# Patient Record
Sex: FEMALE | Race: WHITE
[De-identification: names, ages, dates, MRNs, and addresses within clinical notes are randomized per-mention and may not be internally consistent; named-entity substitution may affect disease eponyms.]

---

## 2019-04-11 ENCOUNTER — HOSPITAL ENCOUNTER (EMERGENCY)
Dept: HOSPITAL 10 - E/R | Age: 49
Discharge: ANOTHER HOSPITAL | End: 2019-04-11
Attending: INTERNAL MEDICINE
Payer: COMMERCIAL

## 2019-04-11 ENCOUNTER — HOSPITAL ENCOUNTER (EMERGENCY)
Dept: HOSPITAL 91 - E/R | Age: 49
Discharge: ANOTHER HOSPITAL | End: 2019-04-11
Payer: COMMERCIAL

## 2019-04-11 VITALS — WEIGHT: 132.28 LBS | HEIGHT: 55 IN | BODY MASS INDEX: 30.61 KG/M2

## 2019-04-11 VITALS — SYSTOLIC BLOOD PRESSURE: 167 MMHG | DIASTOLIC BLOOD PRESSURE: 79 MMHG | HEART RATE: 81 BPM | RESPIRATION RATE: 15 BRPM

## 2019-04-11 DIAGNOSIS — Q28.2: ICD-10-CM

## 2019-04-11 DIAGNOSIS — R79.1: ICD-10-CM

## 2019-04-11 DIAGNOSIS — R40.2132: ICD-10-CM

## 2019-04-11 DIAGNOSIS — D69.6: ICD-10-CM

## 2019-04-11 DIAGNOSIS — K70.30: ICD-10-CM

## 2019-04-11 DIAGNOSIS — I61.8: Primary | ICD-10-CM

## 2019-04-11 DIAGNOSIS — R74.0: ICD-10-CM

## 2019-04-11 DIAGNOSIS — D72.819: ICD-10-CM

## 2019-04-11 DIAGNOSIS — R40.2352: ICD-10-CM

## 2019-04-11 DIAGNOSIS — R56.9: ICD-10-CM

## 2019-04-11 DIAGNOSIS — E80.6: ICD-10-CM

## 2019-04-11 DIAGNOSIS — R40.2242: ICD-10-CM

## 2019-04-11 DIAGNOSIS — R41.82: ICD-10-CM

## 2019-04-11 DIAGNOSIS — J45.909: ICD-10-CM

## 2019-04-11 LAB
ABNORMAL IP MESSAGE: 1
ADD MAN DIFF?: NO
ADD UMIC: YES
ALANINE AMINOTRANSFERASE: 55 IU/L (ref 13–69)
ALBUMIN: 4 G/DL (ref 3.3–4.9)
ALKALINE PHOSPHATASE: 72 IU/L (ref 42–121)
AMMONIA: 20 UMOL/L (ref 9–30)
ANION GAP: 12 (ref 5–13)
ASPARTATE AMINO TRANSFERASE: 114 IU/L (ref 15–46)
BASOPHIL #: 0 10^3/UL (ref 0–0.1)
BASOPHILS %: 0.5 % (ref 0–2)
BILIRUBIN,DIRECT: 0 MG/DL (ref 0–0.2)
BILIRUBIN,TOTAL: 4.9 MG/DL (ref 0.2–1.3)
BLOOD UREA NITROGEN: 7 MG/DL (ref 7–20)
CALCIUM: 9.9 MG/DL (ref 8.4–10.2)
CARBON DIOXIDE: 26 MMOL/L (ref 21–31)
CHLORIDE: 101 MMOL/L (ref 97–110)
CREATININE: 0.49 MG/DL (ref 0.44–1)
EOSINOPHILS #: 0 10^3/UL (ref 0–0.5)
EOSINOPHILS %: 0.7 % (ref 0–7)
ETHANOL: < 10 MG/DL (ref 0–0)
GLUCOSE: 132 MG/DL (ref 70–220)
HEMATOCRIT: 37.4 % (ref 37–47)
HEMOGLOBIN: 12.8 G/DL (ref 12–16)
IMMATURE GRANS #M: 0.02 10^3/UL (ref 0–0.03)
IMMATURE GRANS % (M): 0.5 % (ref 0–0.43)
INR: 1.44
LYMPHOCYTES #: 0.7 10^3/UL (ref 0.8–2.9)
LYMPHOCYTES %: 16.8 % (ref 15–51)
MEAN CORPUSCULAR HEMOGLOBIN: 30.5 PG (ref 29–33)
MEAN CORPUSCULAR HGB CONC: 34.2 G/DL (ref 32–37)
MEAN CORPUSCULAR VOLUME: 89 FL (ref 82–101)
MEAN PLATELET VOLUME: 10.5 FL (ref 7.4–10.4)
MONOCYTE #: 0.4 10^3/UL (ref 0.3–0.9)
MONOCYTES %: 8.9 % (ref 0–11)
NEUTROPHIL #: 2.9 10^3/UL (ref 1.6–7.5)
NEUTROPHILS %: 72.6 % (ref 39–77)
NUCLEATED RED BLOOD CELLS #: 0 10^3/UL (ref 0–0)
NUCLEATED RED BLOOD CELLS%: 0 /100WBC (ref 0–0)
PLATELET COUNT: 62 10^3/UL (ref 140–415)
POSITIVE DIFF: (no result)
POTASSIUM: 3.7 MMOL/L (ref 3.5–5.1)
PROTIME: 17.6 SEC (ref 11.9–14.9)
PT RATIO: 1.4
RED BLOOD COUNT: 4.2 10^6/UL (ref 4.2–5.4)
RED CELL DISTRIBUTION WIDTH: 15 % (ref 11.5–14.5)
SODIUM: 139 MMOL/L (ref 135–144)
TOTAL PROTEIN: 7.9 G/DL (ref 6.1–8.1)
TYPE AND SCREEN: 1 1
UR ASCORBIC ACID: 40 MG/DL
UR BACTERIA: (no result) /HPF
UR BILIRUBIN (DIP): NEGATIVE MG/DL
UR BLOOD (DIP): (no result) MG/DL
UR CLARITY: CLEAR
UR COLOR: YELLOW
UR GLUCOSE (DIP): NEGATIVE MG/DL
UR KETONES (DIP): (no result) MG/DL
UR LEUKOCYTE ESTERASE (DIP): NEGATIVE LEU/UL
UR MUCUS: (no result) /HPF
UR NITRITE (DIP): NEGATIVE MG/DL
UR PH (DIP): 6 (ref 5–9)
UR RBC: 4 /HPF (ref 0–5)
UR SPECIFIC GRAVITY (DIP): 1.01 (ref 1–1.03)
UR TOTAL PROTEIN (DIP): NEGATIVE MG/DL
UR UROBILINOGEN (DIP): (no result) MG/DL
UR WBC: 4 /HPF (ref 0–5)
WHITE BLOOD COUNT: 4 10^3/UL (ref 4.8–10.8)

## 2019-04-11 PROCEDURE — P9059 PLASMA, FRZ BETWEEN 8-24HOUR: HCPCS

## 2019-04-11 PROCEDURE — 85610 PROTHROMBIN TIME: CPT

## 2019-04-11 PROCEDURE — 81025 URINE PREGNANCY TEST: CPT

## 2019-04-11 PROCEDURE — 81001 URINALYSIS AUTO W/SCOPE: CPT

## 2019-04-11 PROCEDURE — 36415 COLL VENOUS BLD VENIPUNCTURE: CPT

## 2019-04-11 PROCEDURE — 80048 BASIC METABOLIC PNL TOTAL CA: CPT

## 2019-04-11 PROCEDURE — 36430 TRANSFUSION BLD/BLD COMPNT: CPT

## 2019-04-11 PROCEDURE — 86901 BLOOD TYPING SEROLOGIC RH(D): CPT

## 2019-04-11 PROCEDURE — 99285 EMERGENCY DEPT VISIT HI MDM: CPT

## 2019-04-11 PROCEDURE — 71045 X-RAY EXAM CHEST 1 VIEW: CPT

## 2019-04-11 PROCEDURE — 82140 ASSAY OF AMMONIA: CPT

## 2019-04-11 PROCEDURE — 96374 THER/PROPH/DIAG INJ IV PUSH: CPT

## 2019-04-11 PROCEDURE — 93005 ELECTROCARDIOGRAM TRACING: CPT

## 2019-04-11 PROCEDURE — 80076 HEPATIC FUNCTION PANEL: CPT

## 2019-04-11 PROCEDURE — 70496 CT ANGIOGRAPHY HEAD: CPT

## 2019-04-11 PROCEDURE — 96375 TX/PRO/DX INJ NEW DRUG ADDON: CPT

## 2019-04-11 PROCEDURE — 70498 CT ANGIOGRAPHY NECK: CPT

## 2019-04-11 PROCEDURE — 86850 RBC ANTIBODY SCREEN: CPT

## 2019-04-11 PROCEDURE — 70450 CT HEAD/BRAIN W/O DYE: CPT

## 2019-04-11 PROCEDURE — 85025 COMPLETE CBC W/AUTO DIFF WBC: CPT

## 2019-04-11 PROCEDURE — 86900 BLOOD TYPING SEROLOGIC ABO: CPT

## 2019-04-11 PROCEDURE — 80307 DRUG TEST PRSMV CHEM ANLYZR: CPT

## 2019-04-11 RX ADMIN — IOHEXOL 1 ML/S: 350 INJECTION, SOLUTION INTRAVENOUS at 15:27

## 2019-04-11 RX ADMIN — THIAMINE HYDROCHLORIDE 1 MLS/HR: 100 INJECTION, SOLUTION INTRAMUSCULAR; INTRAVENOUS at 14:26

## 2019-04-11 RX ADMIN — SODIUM CHLORIDE 1 MLS/HR: 9 INJECTION, SOLUTION INTRAVENOUS at 16:55

## 2019-04-11 RX ADMIN — LEVETIRACETAM 1 MLS/HR: 10 INJECTION INTRAVENOUS at 15:02

## 2019-04-11 RX ADMIN — VASOPRESSIN 1 ML/S: 20 INJECTION, SOLUTION INTRAMUSCULAR; SUBCUTANEOUS at 15:27

## 2019-04-11 RX ADMIN — LIDOCAINE HYDROCHLORIDE 1 MLS/HR: 10 INJECTION, SOLUTION EPIDURAL; INFILTRATION; INTRACAUDAL; PERINEURAL at 16:54

## 2019-04-11 RX ADMIN — POTASSIUM ACETATE 1 MLS/HR: 3.93 INJECTION, SOLUTION, CONCENTRATE INTRAVENOUS at 16:58

## 2019-04-11 RX ADMIN — LIDOCAINE HYDROCHLORIDE 1 MLS/HR: 10 INJECTION, SOLUTION EPIDURAL; INFILTRATION; INTRACAUDAL; PERINEURAL at 14:44

## 2019-04-11 RX ADMIN — SODIUM CHLORIDE 1 ML: 9 INJECTION, SOLUTION INTRAMUSCULAR; INTRAVENOUS; SUBCUTANEOUS at 15:27

## 2019-04-11 NOTE — ERD
ER Documentation


Chief Complaint


Chief Complaint


Shaking episode, altered mental status





HPI


This is a 48-year-old female with a past medical history of alcoholic cirrhosis 


requiring paracenteses in the past, asthma, gastritis/GERD, previous episodes of


GI bleeding who is presenting today for altered mentation.  The patient 


reportedly went to work and seemed fine this morning.  She went out to her car 


to go get something but did not come back and side.  The coworkers were worried 


about her, so they found her altered in her van.  An ambulance was called.  In 


route to the hospital, the paramedics witnessed several generalized tonic-clonic


shaking episodes.  The patient was given a total of 10 mg of Versed IV prior to 


arrival.  The patient is moving all extremities without difficulty, but she is 


confused and currently unable to answer questions.  According to the patient's 


family, the patient has had a headache for several days.





History and physical is limited secondary to altered mentation.





ROS


Limited secondary to altered mentation.





Medications


Home Meds


Reported Medications


Albuterol Sulfate* (Ventolin HFA*) 18 Gm Hfa.aer.ad, 2 PUFF INHALATION Q4H, #1 


INHALER


   19


Discontinued Reported Medications


Omeprazole* (Omeprazole*) 20 Mg Capsule.dr, 20 MG PO DAILY


   13


Spironolactone* (Aldactone*) 50 Mg Tablet, 50 MG PO DAILY


   13


Furosemide (Lasix) 40 Mg Tab, 40 MG PO DAILY


   13





Allergies


Allergies:  


Coded Allergies:  


     No Known Allergies (Verified  Allergy, Unknown, 19)





PMhx/Soc


History of Surgery:  Yes ()


Anesthesia Reaction:  No


Hx Neurological Disorder:  No


Hx Respiratory Disorders:  No


Hx Cardiac Disorders:  No


Hx Psychiatric Problems:  No


Hx Miscellaneous Medical Probl:  Yes (EDEMA IN ABDOMEN AND FEET)


Hx Alcohol Use:  Yes (OCCASIONAL)


Hx Substance Use:  No


Hx Tobacco Use:  No





FmHx


Unable to obtain secondary to altered mentation.





Physical Exam


Vitals





Vital Signs


  Date      Temp  Pulse  Resp  B/P (MAP)   Pulse Ox  O2          O2 Flow    FiO2


Time                                                 Delivery    Rate


   19           85    18      154/83       100  Room Air


     15:00                          (106)


   19           78    18      136/77       100  Room Air


     14:30                           (96)


   19           78    14      139/57        98  Room Air


     14:00                           (84)


   19  98.6    105    20      143/82        98


     13:34                          (102)





Physical Exam


Const:   No apparent distress, well-developed, well-nourished


Head:   Normocephalic, Atraumatic 


Eyes:   Normal Conjunctiva.  No scleral icterus.  Pupils equal, round and 


reactive to light


ENT:   Normal External Ears, Nose and Mouth.  Abrasions to her tongue.


Neck:   Trachea midline.  No palpable masses.  No step-offs or deformities to 


the cervical spine.


Resp:   Clear to auscultation bilaterally, No wheezes, rales or rhonchi


Cardio:   Regular rate and rhythm. No murmurs, rubs or gallops


Abd:   Soft, non tender, non distended. Normal bowel sounds


Skin:   No petechiae or rashes


Back:   No step-offs or deformities.


Ext:   No cyanosis, or edema


Neur:   Eyes are closed.  Patient makes incomprehensible sounds.  Patient 


localizes pain.  GCS 8.


Result Diagram:  


19 1300                                                                    


           19 1259





Results 24 hrs





Laboratory Tests


Test
              19
12:59   19
13:00    19
13:20  19
13:32


Sodium Level         139 mmol/L


Potassium Level      3.7 mmol/L


Chloride Level       101 mmol/L


Carbon Dioxide        26 mmol/L


Level


Anion Gap                    12


Blood Urea              7 mg/dl


Nitrogen


Creatinine           0.49 mg/dl


Est Glomerular     > 60 mL/min 
  
               
                



Filtrat


Rate
mL/min


Glucose Level         132 mg/dl


Calcium Level         9.9 mg/dl


Ethyl Alcohol      < 10.0 mg/dl


Level


White Blood Count                   4.0 10^3/ul


Red Blood Count                    4.20 10^6/ul


Hemoglobin                            12.8 g/dl


Hematocrit                               37.4 %


Mean Corpuscular                        89.0 fl


Volume


Mean Corpuscular                        30.5 pg


Hemoglobin


Mean Corpuscular   
                 34.2 g/dl 
  
                



Hemoglobin
Concen


t


Red Cell                                 15.0 %


Distribution


Width


Platelet Count                       62 10^3/UL


Mean Platelet                           10.5 fl


Volume


Immature                                0.500 %


Granulocytes %


Neutrophils %                            72.6 %


Lymphocytes %                            16.8 %


Monocytes %                               8.9 %


Eosinophils %                             0.7 %


Basophils %                               0.5 %


Nucleated Red                       0.0 /100WBC


Blood Cells %


Immature                          0.020 10^3/ul


Granulocytes #


Neutrophils #                       2.9 10^3/ul


Lymphocytes #                       0.7 10^3/ul


Monocytes #                         0.4 10^3/ul


Eosinophils #                       0.0 10^3/ul


Basophils #                         0.0 10^3/ul


Nucleated Red                       0.0 10^3/ul


Blood Cells #


Urine Color                                       YELLOW


Urine Clarity                                     CLEAR


Urine pH                                                     6.0


Urine Specific                                             1.012


Gravity


Urine Ketones                                     TRACE mg/dL


Urine Nitrite                                     NEGATIVE mg/dL


Urine Bilirubin                                   NEGATIVE mg/dL


Urine                                                   2+ mg/dL


Urobilinogen


Urine Leukocyte    
              
               NEGATIVE
Jerel/ul  



Esterase



Urine Microscopic                                         4 /HPF


RBC


Urine Microscopic                                         4 /HPF


WBC


Urine Bacteria                                    FEW /HPF


Urine Mucus                                       MANY /HPF


Urine Hemoglobin                                        1+ mg/dL


Urine Glucose                                     NEGATIVE mg/dL


Urine Total                                       NEGATIVE mg/dl


Protein


Urine Opiates                                     Negative


Screen


Urine                                             Negative


Barbiturates


Urine                                             Negative


Amphetamines


Screen


Urine                                             Positive


Benzodiazepines


Screen


Urine Cocaine                                     Negative


Screen


Urine                                             Negative


Cannabinoids


POC Beta HCG,                                                      NEGATIVE


Qualitative


Test
              19
13:36  
               
                



Prothrombin Time       17.6 Sec


Prothrombin Time            1.4


Ratio


INR International         1.44 
  
               
                



Normalized
Ratio


Total Bilirubin       4.9 mg/dl


Direct Bilirubin     0.00 mg/dl


Indirect              4.9 mg/dl


Bilirubin


Aspartate Amino       114 IU/L 
  
               
                



Transf
(AST/SGOT)


Alanine                55 IU/L 
  
               
                



Aminotransferase



(ALT/SGPT)


Alkaline                72 IU/L


Phosphatase


Ammonia               20 umol/l


Total Protein          7.9 g/dl


Albumin                4.0 g/dl





Current Medications


 Medications
   Dose
          Sig/Patrice
       Start Time
   Status  Last


 (Trade)       Ordered        Route
 PRN     Stop Time              Admin
Dose


                              Reason                                Admin


 Sodium         1,000 ml @ 
   Q1H STAT
      19       DC           19


Chloride       1,000 mls/hr   IV
            12:53
                       14:26



                                             19 13:52


                100 ml @ 
     ONCE  ONCE
    19       DC           19


Levetiracetam  400 mls/hr     IVPB
          15:00
                       15:02



                                             19 15:00


 Sodium         250 ml @ 0
    Q0M ONCE
      19       DC       



Chloride       mls/hr         IV*
           14:44



                                             19 14:48


 Ondansetron    4 mg           ER BRIDGE      19                



HCl
  (Zofran                 PRN
 IV
       15:00



Inj)                          NAUSEA/VOMITI  19 14:59


                              NG


                650 mg         ER BRIDGE      19                



Acetaminophen                 PRN
 PO
       15:00




  (Tylenol                   .MILD PAIN     4/12/19 14:59


Tab)                          1-3 OR TEMP


 Sodium         1,000 ml @ 
   Q20H
 IV
      19       DC       



Chloride       50 mls/hr                     14:52



                                             19 15:38


 IV Flush
      3 ml           PER            19                



(NS 3 ml)                     PROTOCOL
 IV
  15:00



 Ondansetron    4 mg           Q6H  PRN
      19                



HCl
  (Zofran                 IV
            15:00



Inj)                          NAUSEA/VOMITI


                              NG


                40 mg          DAILY@06
      19                



Pantoprazole
                 IV
            06:00



 (Protonix


Iv)


                100 ml @ 
     Q12
 IVPB
     19                



Levetiracetam  400 mls/hr                    23:00



 Lorazepam
     2 mg           Q10MIN  PRN
   19                



(Ativan)                      IV
 seizure    15:00



 Labetalol      10 mg          Q4  PRN
 IV
   19                



HCl
                          sbp >160       15:00



(Labetalol)


 Sodium         0 ml @ 0
      Q0M ONCE
      19       DC       



Chloride       mls/hr         IV
            15:21



                                             19 15:22


 IV Flush
      10 ml          STK-MED        19       DC           19


(NS 10 ml)                    ONCE
 .ROUTE
  15:27
                       15:27



                                             19 15:28


 Sodium         100 ml @ ud    STK-MED        19       DC           19


Chloride                      ONCE
 .ROUTE
  15:27
                       15:27



                                             19 15:28


 Iohexol        100 ml @ ud    STK-MED        19       DC           19


                              ONCE
 .ROUTE
  15:27
                       15:27



                                             19 15:28


 Albuterol/
    3 ml           Q2H RESP       19                



Ipratropium
                  THERAPY  PRN
  15:30



(Duoneb)                      HHN



                              shortness of


                              breath


 Sodium         1,000 ml @ 
   Q20H
 IV
      19                



Chloride       50 mls/hr                     16:00



                54.5 ml @ 
    ONCE  ONCE
    19                



Desmopressin
  109 mls/hr     IVPB
          17:30



Acetate 18                                   19 17:59


mcg/
 Sodium


Chloride








Procedures/MDM


MDM





The patient's presentation warrants further investigation. Previous medical re


cords, if available, were reviewed.





LABS





The patient's laboratory testing was obtained and reviewed. No emergent 


treatment was required unless described below.





CBC:   No E/o systemic infection or severe anemia.  Leukopenia and 


thrombocytopenia, consistent with alcoholic liver disease.


Chemistry:   No E/o severe acidosis or alkalosis or renal failure or liver 


disease or diabetic ketoacidosis


INR:   Slightly elevated at 1.44


Ammonia:   Within normal limits


Urine:   No E/o acute infection or hematuria


Tox:   No E/o alcohol abuse. No E/o illicit drug use.


EKG





EKG read by me: 


Rate/Rhythm:    Regular rate and rhythm at a rate of 87 bpm


Intervals:    Normal


Axis:    Normal


Impression:    No evidence of acute ischemia or arrhythmia





IMAGING





Imaging and Radiology interpretation reviewed.





CXR


FINDINGS: The heart and mediastinum are within normal limits.  The lungs are 


clear.  There is no pleural effusion or pneumothorax.   There is a healed left 


mid to distal clavicular fracture.  


IMPRESSION: No acute disease.


Electronically viewed and signed by .Barry Gallardo MD, MD on 2019 


13:18 





CT Head


FINDINGS: There is acute interparenchymal hemorrhage involving the inferior and 


anterior aspect of right lentiform nucleus extending to the posterior aspect of 


frontal lobe with associated moderate peripheral edema. There is approximately 3


mm leftward midline shift. There is no hydrocephalus. 


There is no extra-axial fluid collection. The ventricles and sulci are normal in


size and configuration. The density of the brain is otherwise normal, and the 


gray white matter differentiation appears well-preserved.  The visualized 


paranasal sinuses and osseous structures are grossly unremarkable. 


IMPRESSION: Acute interparenchymal hemorrhage involving the inferior and 


anterior aspect of right lentiform nucleus extending to the posterior frontal 


lobe with associated moderate peripheral edema. Leftward midline shift of 3 mm. 


Findings discussed with Dr. Arcihbald at 02:30 p.m. on 2019.


Electronically viewed and signed by Physician Shira on 2019 


14:49 





TREATMENT/DISPOSITION





The patient presents with altered mentation and concerns of a seizure event.  


The patient CT of the head reveals an acute right intraparenchymal hemorrhage 


with expansion and shift.  The patient was ordered FFP, platelets and DDAVP to 


help with the bleeding.  The patient was also given Keppra in the emergency 


department for seizure prophylaxis.  While in the emergency department, the 


patient's mentation improved.  She was tired but easily arousable.  She was 


verbal and oriented x1.  As the GCS is improving, I do not feel the patient 


requires intubation emergently.





The patient's alcohol level is currently negative. She does have a history of 


alcoholic cirrhosis, but the family reports that she has not had any alcohol in 


several years.  I do not suspect an alcohol withdrawal seizure.  The patient is 


postictal in the emergency department.  I have decreased suspicion for an 


infectious process.  The patient is afebrile.  The patient does have a mild 


leukopenia, but I suspect that this is related to her alcohol abuse over a 


systemic infection.





I had initially intended to admit the patient to the ICU, as the radiologist did


not feel that this was related to cerebral aneurysm or vascular injury. I 


discussed the case with the on-call neurosurgeon, Dr. Sebastian, who recommended 


that we emergently perform a CTA of the head and neck to confirm this.  He read 


the CTA study, which was concerning for an cerebral AV malformation, which is 


likely the source of her bleeding.  He recommended that the patient be 


transferred to [facility] for higher level of care.





TRANSFER





At this time, I feel that the patient requires admission for further evaluation 


and management. Dr. Sebastian also works at [facility] and accepted the patient 


to the neuro ICU at [facility].





Disclaimer: Inadvertent spelling and grammatical errors are likely due to 


EHR/dictation software use and do not reflect on the overall quality of patient 


care. Note that the electronic time recorded on this note does not necessarily 


reflect the actual time of the patient encounter.





CRITICAL CARE NOTE





Time: 40 minutes excluding all billable procedures.





Treatments/Evaluations: The patient was at risk of hemodynamic compromise.  


Timing of critical care involved close serial monitoring, evaluation of the 


patient's medical record including previous records & current laboratory/imaging


studies, potential interventions for prevention of hemodynamic/ cardiopulmonary/


neurologic compromise, maintaining tight fluid balance, and any discussions with


the family and/or consultants regarding the patient's status and prognosis.





Departure


Diagnosis:  


   Primary Impression:  


   Intraparenchymal hemorrhage of brain


   Additional Impressions:  


   Seizure


   Altered mental status


   Altered mental status type:  unspecified  Qualified Codes:  R41.82 - Altered 


   mental status, unspecified


   Alcoholic cirrhosis


   Ascites presence:  without ascites  Qualified Codes:  K70.30 - Alcoholic 


   cirrhosis of liver without ascites


   Leukopenia


   Leukopenia type:  unspecified  Qualified Codes:  D72.819 - Decreased white 


   blood cell count, unspecified


   Thrombocytopenia


   Cerebral AV malformation


   Hyperbilirubinemia


   Transaminitis


   Elevated INR


Condition:  Critical











TERI ARCHIBALD MD              2019 13:29

## 2019-04-11 NOTE — HP
Date/Time of Note


Date/Time of Note


DATE: 4/11/19 


TIME: 15:46





Assessment/Plan


VTE Prophylaxis


Pharmacological prophylaxis:  other





Lines/Catheters


IV Catheter Type (from [facility]):  Saline Lock





Assessment/Plan


Hospital Course


Patient is a  female the past medical history significant for alcoholic


liver cirrhosis, asthma, history of GI bleed, GERD who presents to [facility] for brain bleed.  Patient was fine this morning and went 


to work as usual.  Patient apparently went out to her car from other coworkers 


and did not return.  Patient was subsequently found by passerby passed out in 


her van.  Ambulance was called and on route to the hospital paramedics witnessed


several generalized tonic-clonic seizures.  Patient currently is drowsy however 


is able to answer yes or no questions although slowly.  HPI is difficult to rece


vincent as patient is still mildly altered.  According to patient  at bedside


the patient has not drink alcohol in quite a while.





Objective





Physical exam





General: Patient is laying in bed and answers questions with yes or no very 


slowly


Mentation: Patient is not currently oriented drowsy but arousable,,


Head: Normocephalic atraumatic


Eyes: EOMI, pupils reactive to light


Neck: Supple, nontender, midline


Respiratory: Clear to auscultation bilaterally


Cardiovascular: regular rate, no obvious murmurs


Gastrointestinal: non-tender to palpation, bowel sounds heard.


Neurological: Moves all extremities spontaneously, but left upper extremity and 


left lower extremity is slightly weaker than the right.


Skin: No new skin lesions





Assessment and plan





Acute intraparenchymal hemorrhage


-Inferior and anterior aspect of the right lentiform nucleus extending to the 


posterior aspect of frontal lobe with edema with a 3 mm midline shift 


-neurosurgery, Dr. Sebastian, called, wants CT angio to evaluate for vascular 


issues before admission, if aneurysm is likely, patient will be transferred to 


higher level of care


-The patient was mildly drowsy on evaluation however she did receive Versed on 


entry into the ED so unsure whether or not this is from medication or from the 


brain bleed


-Neurology, Dr. Kirkpatrick, consulted


-Keep blood pressure normotensive


-PPI


-N.p.o.


-Neurosurgery recommendations





Tonic-clonic movement


-Seizure versus other shaking


-As needed Ativan


-Keppra IV


-Neurology consultation pending





Alcoholic liver cirrhosis


-Elevated bilirubin


-Patient on spironolactone and Lasix at home, hold for now


-Chronic issue, monitor closely





Asthma


-DuoNeb as needed





GERD


-PPI





Disposition


-Awaiting results of CT angiogram, possible transfer, otherwise, will go to our 


ICU with neurosurgeon and neurology consultation.


Result Diagram:  


4/11/19 1300                                                                    


           4/11/19 1259





Results 24hrs





Laboratory Tests


Test
                 4/11/19
12:59  4/11/19
13:00  4/11/19
13:20  4/11/19
13:32


Sodium Level                  139


Potassium Level               3.7


Chloride Level                101


Carbon Dioxide Level           26


Anion Gap                      12


Blood Urea Nitrogen             7


Creatinine                   0.49


Est Glomerular        > 60  
        
              
              



Filtrat Rate
mL/min


Glucose Level                 132


Calcium Level                 9.9


Ethyl Alcohol Level   < 10.0  H


White Blood Count                           4.0  L


Red Blood Count                             4.20


Hemoglobin                                  12.8


Hematocrit                                  37.4


Mean Corpuscular                            89.0


Volume


Mean Corpuscular                            30.5


Hemoglobin


Mean Corpuscular      
                    34.2  
  
              



Hemoglobin
Concent


Red Cell                                   15.0  H


Distribution Width


Platelet Count                               62  L


Mean Platelet Volume                       10.5  H


Immature                                  0.500  H


Granulocytes %


Neutrophils %                               72.6


Lymphocytes %                               16.8


Monocytes %                                  8.9


Eosinophils %                                0.7


Basophils %                                  0.5


Nucleated Red Blood                          0.0


Cells %


Immature                                   0.020


Granulocytes #


Neutrophils #                                2.9


Lymphocytes #                               0.7  L


Monocytes #                                  0.4


Eosinophils #                                0.0


Basophils #                                  0.0


Nucleated Red Blood                          0.0


Cells #


Urine Color                                         YELLOW


Urine Clarity                                       CLEAR


Urine pH                                                    6.0


Urine Specific                                            1.012


Gravity


Urine Ketones                                       TRACE  A


Urine Nitrite                                       NEGATIVE


Urine Bilirubin                                     NEGATIVE


Urine Urobilinogen                                          2+  H


Urine Leukocyte                                     NEGATIVE


Esterase


Urine Microscopic                                             4


RBC


Urine Microscopic                                             4


WBC


Urine Bacteria                                      FEW  A


Urine Mucus                                         MANY  A


Urine Hemoglobin                                            1+  H


Urine Glucose                                       NEGATIVE


Urine Total Protein                                 NEGATIVE


Urine Opiates Screen                                Negative


Urine Barbiturates                                  Negative


Urine Amphetamines                                  Negative


Screen


Urine                                               Positive


Benzodiazepines


Screen


Urine Cocaine Screen                                Negative


Urine Cannabinoids                                  Negative


POC Beta HCG,                                                      NEGATIVE


Qualitative


Test
                 4/11/19
13:36  
              
              



Prothrombin Time            17.6  H


Prothrombin Time              1.4


Ratio


[employer]R [employer]           1.44  
  
              
              



Normalized
Ratio


Total Bilirubin              4.9  H


Direct Bilirubin             0.00


Indirect Bilirubin           4.9  H


Aspartate Amino             114  H
  
              
              



Transf
(AST/SGOT)


Alanine                       55  
  
              
              



Aminotransferase
(AL


T/SGPT)


Alkaline Phosphatase           72


Ammonia                        20


Total Protein                 7.9


Albumin                       4.0








HPI/ROS


Admit Date/Time


Admit Date/Time








PMH/Family/Social


Past Medical History


Medications





Current Medications


Ondansetron HCl (Zofran Inj) 4 mg ER BRIDGE PRN IV NAUSEA/VOMITING;  Start 


4/11/19 at 15:00;  Stop 4/12/19 at 14:59


Acetaminophen (Tylenol Tab) 650 mg ER BRIDGE PRN PO .MILD PAIN 1-3 OR TEMP;  


Start 4/11/19 at 15:00;  Stop 4/12/19 at 14:59


Sodium Chloride 1,000 ml @  50 mls/hr Q20H IV ;  Start 4/11/19 at 14:52


IV Flush (NS 3 ml) 3 ml PER PROTOCOL IV ;  Start 4/11/19 at 15:00


Ondansetron HCl (Zofran Inj) 4 mg Q6H  PRN IV NAUSEA/VOMITING;  Start 4/11/19 at


15:00


Pantoprazole (Protonix Iv) 40 mg DAILY@06 IV ;  Start 4/12/19 at 06:00


Levetiracetam 100 ml @  400 mls/hr Q12 IVPB ;  Start 4/11/19 at 23:00


Lorazepam (Ativan) 2 mg Q10MIN  PRN IV seizure;  Start 4/11/19 at 15:00


Labetalol HCl (Labetalol) 10 mg Q4  PRN IV sbp >160;  Start 4/11/19 at 15:00


Coded Allergies:  


     No Known Allergies (Verified  Allergy, Unknown, 4/11/19)





Social History


Smoking Status:  Unknown if ever smoked





Exam/Review of Systems


Vital Signs


Vitals





Vital Signs


  Date      Temp  Pulse  Resp  B/P (MAP)   Pulse Ox  O2          O2 Flow    FiO2


Time                                                 Delivery    Rate


   4/11/19           85    18      154/83       100  Room Air


     15:00                          (106)


   4/11/19  98.6


     13:34














HILARIO STOCK                    Apr 11, 2019 15:46

## 2019-04-11 NOTE — QN
Documentation


Comment


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 This will require transfer to 


higher level of care. Discussed with ER and I will accept patient in transfer to


[facility].











YENNY FERRELL MD          Apr 11, 2019 16:07